# Patient Record
Sex: FEMALE | Race: WHITE | NOT HISPANIC OR LATINO | URBAN - METROPOLITAN AREA
[De-identification: names, ages, dates, MRNs, and addresses within clinical notes are randomized per-mention and may not be internally consistent; named-entity substitution may affect disease eponyms.]

---

## 2018-10-23 ENCOUNTER — IMPORTED ENCOUNTER (OUTPATIENT)
Dept: URBAN - METROPOLITAN AREA CLINIC 50 | Facility: CLINIC | Age: 73
End: 2018-10-23

## 2018-11-14 ENCOUNTER — IMPORTED ENCOUNTER (OUTPATIENT)
Dept: URBAN - METROPOLITAN AREA CLINIC 50 | Facility: CLINIC | Age: 73
End: 2018-11-14

## 2019-04-25 ENCOUNTER — IMPORTED ENCOUNTER (OUTPATIENT)
Dept: URBAN - METROPOLITAN AREA CLINIC 50 | Facility: CLINIC | Age: 74
End: 2019-04-25

## 2019-10-24 ENCOUNTER — IMPORTED ENCOUNTER (OUTPATIENT)
Dept: URBAN - METROPOLITAN AREA CLINIC 50 | Facility: CLINIC | Age: 74
End: 2019-10-24

## 2020-05-28 ENCOUNTER — IMPORTED ENCOUNTER (OUTPATIENT)
Dept: URBAN - METROPOLITAN AREA CLINIC 50 | Facility: CLINIC | Age: 75
End: 2020-05-28

## 2020-06-01 ENCOUNTER — IMPORTED ENCOUNTER (OUTPATIENT)
Dept: URBAN - METROPOLITAN AREA CLINIC 50 | Facility: CLINIC | Age: 75
End: 2020-06-01

## 2020-06-11 ENCOUNTER — IMPORTED ENCOUNTER (OUTPATIENT)
Dept: URBAN - METROPOLITAN AREA CLINIC 50 | Facility: CLINIC | Age: 75
End: 2020-06-11

## 2020-06-18 ENCOUNTER — IMPORTED ENCOUNTER (OUTPATIENT)
Dept: URBAN - METROPOLITAN AREA CLINIC 50 | Facility: CLINIC | Age: 75
End: 2020-06-18

## 2021-04-17 ASSESSMENT — VISUAL ACUITY
OD_SC: 20/30-
OD_SC: 20/30-1
OD_SC: 20/30-2
OD_SC: 20/25
OD_SC: 20/40
OD_PH: 20/25
OS_BAT: 20/30
OD_CC: J1+
OD_PH: 20/30
OS_SC: 20/20-2
OD_OTHER: 20/50. 20/70-.
OD_BAT: 20/50
OS_SC: 20/25-2
OS_CC: J1+@ 16 IN
OD_BAT: 20/50
OS_BAT: 20/40
OS_OTHER: 20/40. 20/50.
OS_CC: J1+
OS_CC: J1+
OS_SC: 20/25+
OS_SC: 20/25
OD_SC: 20/40+
OD_CC: J1+
OD_BAT: 20/25
OS_CC: J1+
OS_OTHER: 20/40. 20/50.
OS_SC: 20/20
OS_BAT: 20/40
OS_CC: J1+
OD_CC: J1+
OS_OTHER: 20/30. 20/30.
OD_OTHER: 20/25. 20/40.
OS_SC: 20/25
OD_SC: 20/30
OD_CC: J1+@ 16 IN
OD_OTHER: 20/50. 20/70.
OS_SC: 20/25
OD_CC: J1+
OS_PH: 20/20

## 2021-04-17 ASSESSMENT — TONOMETRY
OD_IOP_MMHG: 13
OS_IOP_MMHG: 15
OD_IOP_MMHG: 9
OD_IOP_MMHG: 14
OD_IOP_MMHG: 17
OS_IOP_MMHG: 15
OD_IOP_MMHG: 16
OS_IOP_MMHG: 16
OS_IOP_MMHG: 16
OS_IOP_MMHG: 15
OS_IOP_MMHG: 15
OD_IOP_MMHG: 14
OS_IOP_MMHG: 16
OD_IOP_MMHG: 15
OD_IOP_MMHG: 16
OD_IOP_MMHG: 18
OS_IOP_MMHG: 16
OD_IOP_MMHG: 12
OS_IOP_MMHG: 11
OS_IOP_MMHG: 12
OS_IOP_MMHG: 17
OS_IOP_MMHG: 16
OD_IOP_MMHG: 12
OD_IOP_MMHG: 18
OS_IOP_MMHG: 18
OD_IOP_MMHG: 13
OD_IOP_MMHG: 17

## 2021-04-17 ASSESSMENT — PACHYMETRY
OS_CT_UM: 574
OS_CT_UM: 574
OD_CT_UM: 597
OS_CT_UM: 574
OD_CT_UM: 597
OS_CT_UM: 574
OS_CT_UM: 574

## 2021-12-13 NOTE — PATIENT DISCUSSION
Continue drops as directed. Products Recommended: specifically rec. titanium and zinc oxides. General Sunscreen Counseling: I recommended a broad spectrum sunscreen with a SPF of 30 or higher.  I explained that SPF 30 sunscreens block approximately 97 percent of the sun's harmful rays.  Sunscreens should be applied at least 15 minutes prior to expected sun exposure and then every 2 hours after that as long as sun exposure continues. If swimming or exercising sunscreen should be reapplied every 45 minutes to an hour after getting wet or sweating.  One ounce, or the equivalent of a shot glass full of sunscreen, is adequate to protect the skin not covered by a bathing suit. I also recommended a lip balm with a sunscreen as well. Sun protective clothing can be used in lieu of sunscreen but must be worn the entire time you are exposed to the sun's rays. Detail Level: Detailed

## 2022-08-03 NOTE — PATIENT DISCUSSION
Doing well. Spine appears normal, range of motion is not limited, no muscle or joint tenderness. no focal bony tenderness on exam. FROM of all extremities, no midline vertebral tenderness, no chest wall or rib tenderness, no pelvic tenderness. radial and dp pulses equal and intact bilaterally.